# Patient Record
Sex: MALE | Race: WHITE | URBAN - METROPOLITAN AREA
[De-identification: names, ages, dates, MRNs, and addresses within clinical notes are randomized per-mention and may not be internally consistent; named-entity substitution may affect disease eponyms.]

---

## 2021-10-19 ENCOUNTER — OFFICE VISIT (OUTPATIENT)
Dept: URBAN - METROPOLITAN AREA CLINIC 1 | Facility: CLINIC | Age: 86
End: 2021-10-19
Payer: MEDICARE

## 2021-10-19 PROCEDURE — 99212 OFFICE O/P EST SF 10 MIN: CPT | Performed by: SPECIALIST

## 2021-10-19 ASSESSMENT — INTRAOCULAR PRESSURE
OD: 15
OS: 16

## 2021-10-19 NOTE — IMPRESSION/PLAN
Impression: Presence of intraocular lens: Z96.1. Plan: Will continue to monitor with dilated exam on return.

## 2021-10-19 NOTE — IMPRESSION/PLAN
Impression: Tear film insufficiency of bilateral lacrimal glands: H04.123. Plan: Dry eyes account for the patient's complaints. There is no evidence of permanent changes to the cornea. Explained condition does not have a cure and will need artificial tears for maintenance. Pt to use AT's bid to bid ou and see if that helped with his c/o. Pt could not be dilated today he is to return to complete dilated exam at his convenience. Samples given. 


RTC CE when patient is available 1-2 weeks

## 2022-03-29 ENCOUNTER — OFFICE VISIT (OUTPATIENT)
Dept: URBAN - METROPOLITAN AREA CLINIC 1 | Facility: CLINIC | Age: 87
End: 2022-03-29
Payer: MEDICARE

## 2022-03-29 DIAGNOSIS — H04.123 TEAR FILM INSUFFICIENCY OF BILATERAL LACRIMAL GLANDS: ICD-10-CM

## 2022-03-29 DIAGNOSIS — Z96.1 PRESENCE OF INTRAOCULAR LENS: ICD-10-CM

## 2022-03-29 DIAGNOSIS — H26.493 OTHER SECONDARY CATARACT, BILATERAL: Primary | ICD-10-CM

## 2022-03-29 PROCEDURE — 99214 OFFICE O/P EST MOD 30 MIN: CPT | Performed by: SPECIALIST

## 2022-03-29 ASSESSMENT — INTRAOCULAR PRESSURE
OS: 15
OD: 15

## 2022-03-29 NOTE — IMPRESSION/PLAN
Impression: Other secondary cataract, bilateral: H26.493. Plan: Discussed diagnosis in detail with patient. Discussed treatment options with patient. Reassured patient of current condition and treatment. Discussed risks/benefits of laser TX. Patient to return for YAG Capsulotomy OS. Schedule YAG PC OS ?  OD

## 2022-04-20 ENCOUNTER — OFFICE VISIT (OUTPATIENT)
Dept: URBAN - METROPOLITAN AREA CLINIC 1 | Facility: CLINIC | Age: 87
End: 2022-04-20
Payer: MEDICARE

## 2022-04-20 DIAGNOSIS — H26.492 OTHER SECONDARY CATARACT, LEFT EYE: Primary | ICD-10-CM

## 2022-04-20 PROCEDURE — 66821 AFTER CATARACT LASER SURGERY: CPT | Performed by: SPECIALIST

## 2022-05-04 NOTE — IMPRESSION/PLAN
Impression: S/P YAG - posterior capsulotomy OS - 14 Days. Encounter for surgical aftercare following surgery on a sense organ  Z48.810. Excellent post op course Plan: OD no intervention minimal capsule haze.

## 2023-08-10 ENCOUNTER — OFFICE VISIT (OUTPATIENT)
Dept: URBAN - METROPOLITAN AREA CLINIC 1 | Facility: CLINIC | Age: 88
End: 2023-08-10
Payer: MEDICARE

## 2023-08-10 DIAGNOSIS — H26.491 OTHER SECONDARY CATARACT, RIGHT EYE: Primary | ICD-10-CM

## 2023-08-10 DIAGNOSIS — Z96.1 PRESENCE OF INTRAOCULAR LENS: ICD-10-CM

## 2023-08-10 DIAGNOSIS — H01.00A BLEPHARITIS OF RIGHT EYE, UPPER AND LOWER EYELIDS: ICD-10-CM

## 2023-08-10 DIAGNOSIS — H01.00B BLEPHARITIS OF LEFT EYE, UPPER AND LOWER EYELIDS: ICD-10-CM

## 2023-08-10 DIAGNOSIS — H04.123 TEAR FILM INSUFFICIENCY OF BILATERAL LACRIMAL GLANDS: ICD-10-CM

## 2023-08-10 PROCEDURE — 99214 OFFICE O/P EST MOD 30 MIN: CPT | Performed by: OPHTHALMOLOGY

## 2023-08-10 RX ORDER — PREDNISOLONE ACETATE 10 MG/ML
1 % SUSPENSION/ DROPS OPHTHALMIC
Qty: 5 | Refills: 1 | Status: ACTIVE
Start: 2023-08-10

## 2023-08-10 ASSESSMENT — INTRAOCULAR PRESSURE
OD: 18
OS: 18

## 2023-09-06 ENCOUNTER — OFFICE VISIT (OUTPATIENT)
Dept: URBAN - METROPOLITAN AREA CLINIC 1 | Facility: CLINIC | Age: 88
End: 2023-09-06
Payer: MEDICARE

## 2023-09-06 DIAGNOSIS — H01.00B BLEPHARITIS OF LEFT EYE, UPPER AND LOWER EYELIDS: ICD-10-CM

## 2023-09-06 DIAGNOSIS — H04.123 TEAR FILM INSUFFICIENCY OF BILATERAL LACRIMAL GLANDS: ICD-10-CM

## 2023-09-06 DIAGNOSIS — H01.00A BLEPHARITIS OF RIGHT EYE, UPPER AND LOWER EYELIDS: Primary | ICD-10-CM

## 2023-09-06 DIAGNOSIS — H35.341 MACULAR CYST, HOLE, OR PSEUDOHOLE, RIGHT EYE: ICD-10-CM

## 2023-09-06 PROCEDURE — 99212 OFFICE O/P EST SF 10 MIN: CPT | Performed by: OPHTHALMOLOGY

## 2023-09-06 PROCEDURE — 92134 CPTRZ OPH DX IMG PST SGM RTA: CPT | Performed by: OPHTHALMOLOGY

## 2023-09-06 ASSESSMENT — INTRAOCULAR PRESSURE
OD: 14
OS: 13